# Patient Record
Sex: MALE
[De-identification: names, ages, dates, MRNs, and addresses within clinical notes are randomized per-mention and may not be internally consistent; named-entity substitution may affect disease eponyms.]

---

## 2020-05-16 ENCOUNTER — TRANSCRIPTION ENCOUNTER (OUTPATIENT)
Age: 50
End: 2020-05-16

## 2021-10-25 PROBLEM — Z00.00 ENCOUNTER FOR PREVENTIVE HEALTH EXAMINATION: Status: ACTIVE | Noted: 2021-10-25

## 2021-10-27 ENCOUNTER — APPOINTMENT (OUTPATIENT)
Dept: UROLOGY | Facility: CLINIC | Age: 51
End: 2021-10-27
Payer: COMMERCIAL

## 2021-10-27 VITALS
SYSTOLIC BLOOD PRESSURE: 149 MMHG | HEART RATE: 87 BPM | WEIGHT: 205 LBS | BODY MASS INDEX: 31.07 KG/M2 | DIASTOLIC BLOOD PRESSURE: 91 MMHG | HEIGHT: 68 IN | TEMPERATURE: 97.2 F

## 2021-10-27 DIAGNOSIS — Z83.3 FAMILY HISTORY OF DIABETES MELLITUS: ICD-10-CM

## 2021-10-27 DIAGNOSIS — Z80.0 FAMILY HISTORY OF MALIGNANT NEOPLASM OF DIGESTIVE ORGANS: ICD-10-CM

## 2021-10-27 DIAGNOSIS — I10 ESSENTIAL (PRIMARY) HYPERTENSION: ICD-10-CM

## 2021-10-27 DIAGNOSIS — E11.9 TYPE 2 DIABETES MELLITUS W/OUT COMPLICATIONS: ICD-10-CM

## 2021-10-27 DIAGNOSIS — M79.10 MYALGIA, UNSPECIFIED SITE: ICD-10-CM

## 2021-10-27 PROCEDURE — 51736 URINE FLOW MEASUREMENT: CPT

## 2021-10-27 PROCEDURE — 99204 OFFICE O/P NEW MOD 45 MIN: CPT | Mod: 25

## 2021-10-27 PROCEDURE — 51798 US URINE CAPACITY MEASURE: CPT

## 2021-10-28 PROBLEM — E11.9 DIABETES: Status: ACTIVE | Noted: 2021-10-27

## 2021-10-28 PROBLEM — Z83.3 FAMILY HISTORY OF DIABETES MELLITUS: Status: ACTIVE | Noted: 2021-10-27

## 2021-10-28 PROBLEM — Z80.0 FAMILY HISTORY OF LIVER CANCER: Status: ACTIVE | Noted: 2021-10-27

## 2021-10-28 PROBLEM — I10 BENIGN ESSENTIAL HYPERTENSION: Status: RESOLVED | Noted: 2021-10-28 | Resolved: 2021-10-28

## 2021-10-28 PROBLEM — M79.10 MUSCLE ACHE: Status: ACTIVE | Noted: 2021-10-27

## 2021-10-28 PROBLEM — Z80.0 FAMILY HISTORY OF PANCREATIC CANCER: Status: ACTIVE | Noted: 2021-10-27

## 2021-10-28 RX ORDER — TICAGRELOR 90 MG/1
90 TABLET ORAL
Qty: 180 | Refills: 0 | Status: ACTIVE | COMMUNITY
Start: 2021-10-24

## 2021-10-28 RX ORDER — ATORVASTATIN CALCIUM 80 MG/1
80 TABLET, FILM COATED ORAL
Qty: 90 | Refills: 0 | Status: ACTIVE | COMMUNITY
Start: 2021-09-09

## 2021-10-28 RX ORDER — METOPROLOL TARTRATE 75 MG/1
TABLET, FILM COATED ORAL
Refills: 0 | Status: ACTIVE | COMMUNITY

## 2021-10-28 RX ORDER — OMEPRAZOLE 20 MG/1
20 CAPSULE, DELAYED RELEASE ORAL
Qty: 90 | Refills: 0 | Status: ACTIVE | COMMUNITY
Start: 2021-10-04

## 2021-10-28 RX ORDER — METOPROLOL SUCCINATE 25 MG/1
25 TABLET, EXTENDED RELEASE ORAL
Qty: 90 | Refills: 0 | Status: ACTIVE | COMMUNITY
Start: 2021-08-09

## 2021-10-28 RX ORDER — NITROGLYCERIN 0.4 MG/1
0.4 TABLET SUBLINGUAL
Qty: 100 | Refills: 0 | Status: ACTIVE | COMMUNITY
Start: 2021-05-05

## 2021-10-28 RX ORDER — BENZONATATE 200 MG/1
200 CAPSULE ORAL
Qty: 30 | Refills: 0 | Status: ACTIVE | COMMUNITY
Start: 2021-08-14

## 2021-10-28 RX ORDER — ASPIRIN 325 MG/1
TABLET, FILM COATED ORAL
Refills: 0 | Status: ACTIVE | COMMUNITY

## 2021-10-28 RX ORDER — AZITHROMYCIN 250 MG/1
250 TABLET, FILM COATED ORAL
Qty: 6 | Refills: 0 | Status: ACTIVE | COMMUNITY
Start: 2021-08-14

## 2021-10-28 RX ORDER — EMPAGLIFLOZIN AND METFORMIN HYDROCHLORIDE 5; 1000 MG/1; MG/1
5-1000 TABLET ORAL
Qty: 90 | Refills: 0 | Status: ACTIVE | COMMUNITY
Start: 2021-09-02

## 2021-10-28 RX ORDER — LOSARTAN POTASSIUM 25 MG/1
25 TABLET, FILM COATED ORAL
Qty: 30 | Refills: 0 | Status: ACTIVE | COMMUNITY
Start: 2021-05-03

## 2021-10-28 RX ORDER — LANCING DEVICE/LANCETS
KIT MISCELLANEOUS
Qty: 1 | Refills: 0 | Status: ACTIVE | COMMUNITY
Start: 2021-10-04

## 2021-10-28 RX ORDER — IBUPROFEN 600 MG/1
600 TABLET, FILM COATED ORAL
Qty: 21 | Refills: 0 | Status: ACTIVE | COMMUNITY
Start: 2021-06-10

## 2021-10-29 NOTE — REVIEW OF SYSTEMS
[History of kidney stones] : history of kidney stones [Wake up at night to urinate  How many times?  ___] : wakes up to urinate [unfilled] times during the night [Strong urge to urinate] : strong urge to urinate [Bladder pressure] : experiences bladder pressure [Wait a long time to urinate] : waits a long time to urinate [Slow urine stream] : slow urine stream [Bladder fullness after urinating] : bladder fullness after urinating [Negative] : Heme/Lymph [Hesitancy] : urinary hesitancy [Nocturia] : nocturia [Urine Infection (bladder/kidney)] : denies bladder/kidney infections [Pain during urination] : denies pain during urination [Pain at onset of urination] : denies pain during onset of urination [Pain after urination] : denies pain after urination [Blood in urine that you can see] : denies seeing blood in urine [Told you have blood in urine on a urine test] : denies being told that blood was present in a urine test [Discharge from urine canal] : denies discharge from urine canal [Urine retention] : denies urine retention [Strain or push to urinate] : denies straining or pushing to urinate [Interrupted urine stream] : denies interrupted urine stream [Increased pain/discomfort with bladder filling] : denies increased pain/discomfort with bladder filling [Bladder problems as child. If yes, describe..] : denies bladder problems as child [Leakage of urine with straining, coughing, laughing] : denies leakage of urine with straining, coughing, and/or laughing [Unaware of when urine is leaking] : aware of when urine is leaking

## 2021-10-29 NOTE — PHYSICAL EXAM
[General Appearance - Well Developed] : well developed [General Appearance - Well Nourished] : well nourished [Normal Appearance] : normal appearance [Well Groomed] : well groomed [General Appearance - In No Acute Distress] : no acute distress [Edema] : no peripheral edema [Respiration, Rhythm And Depth] : normal respiratory rhythm and effort [Exaggerated Use Of Accessory Muscles For Inspiration] : no accessory muscle use [Abdomen Soft] : soft [Costovertebral Angle Tenderness] : no ~M costovertebral angle tenderness [Abdomen Tenderness] : non-tender [Urethral Meatus] : meatus normal [Penis Abnormality] : normal uncircumcised penis [Urinary Bladder Findings] : the bladder was normal on palpation [Scrotum] : the scrotum was normal [Testes Mass (___cm)] : there were no testicular masses [Prostate Tenderness] : the prostate was not tender [No Prostate Nodules] : no prostate nodules [Prostate Size ___ (0-4)] : prostate size [unfilled] (scale: 0-4) [Normal Station and Gait] : the gait and station were normal for the patient's age [] : no rash [No Focal Deficits] : no focal deficits [Oriented To Time, Place, And Person] : oriented to person, place, and time [Affect] : the affect was normal [Mood] : the mood was normal [Not Anxious] : not anxious [No Palpable Adenopathy] : no palpable adenopathy

## 2021-10-29 NOTE — HISTORY OF PRESENT ILLNESS
[FreeTextEntry1] : This is a 51-year-old  male with complaints of slow urination, frequency, urgency, nocturia x3 and sensation of incomplete bladder emptying denies dysuria, hematuria or history of urinary tract infections.  He does not have family history of prostate cancer.  The patient states that he passed a kidney stone approximately 6 years ago.  He is reporting difficulties with his erections and significant premature ejaculation.  He is also describing some decrease in his libido.  PSA on 10/11/2021 was 1.30 ng/mL.

## 2021-10-29 NOTE — ASSESSMENT
[FreeTextEntry1] : Thus, the patient has bothersome voiding symptoms with a good stream but a significant postvoid residual.  In addition he has erectile dysfunction and bothersome premature ejaculation.  The patient be placed on alfuzosin, 10 mg daily and paroxetine, 10 mg nightly for 2 weeks and then as needed.  I urged him to maintain good glycemic control as this can certainly be a factor in both voiding symptoms and sexual function.  We will reevaluate again in 3 months.

## 2022-01-26 ENCOUNTER — APPOINTMENT (OUTPATIENT)
Dept: UROLOGY | Facility: CLINIC | Age: 52
End: 2022-01-26
Payer: COMMERCIAL

## 2022-01-26 PROCEDURE — 99214 OFFICE O/P EST MOD 30 MIN: CPT

## 2022-01-26 PROCEDURE — 51798 US URINE CAPACITY MEASURE: CPT

## 2022-01-26 PROCEDURE — 51741 ELECTRO-UROFLOWMETRY FIRST: CPT

## 2022-01-28 ENCOUNTER — NON-APPOINTMENT (OUTPATIENT)
Age: 52
End: 2022-01-28

## 2022-01-29 NOTE — ASSESSMENT
[FreeTextEntry1] : The patient has persistent but significantly improved lower urinary tract symptoms with excellent stream and bladder emptying and we will continue him on alfuzosin.  He had poor tolerance of paroxetine and we discussed using analgesic spray, such as Promescent, to help with premature ejaculation.  We discussed the potential addition of a PDE 5 inhibitor, which the patient used previously; however, the patient does have a prescription for nitroglycerin and I informed him that there is a absolute contraindication to the use of PDE 5 inhibitors.The patient will come back in 3 months

## 2022-01-29 NOTE — HISTORY OF PRESENT ILLNESS
[FreeTextEntry1] : The patient is reporting improved urinary stream and decreased urgency and nocturia since taking alfuzosin.  He still has some urgency and minimal urge incontinence.  The patient could not tolerate paroxetine as it was making him "blurry" and stopped the medication.  He is describing persistent premature ejaculation and also now complaining of some decreased rigidity with his erections.  He denies dysuria, hematuria or flank pain.

## 2022-01-29 NOTE — LETTER BODY
[Dear  ___] : Dear  [unfilled], [Courtesy Letter:] : I had the pleasure of seeing your patient, [unfilled], in my office today. [Please see my note below.] : Please see my note below. [Consult Closing:] : Thank you very much for allowing me to participate in the care of this patient.  If you have any questions, please do not hesitate to contact me. [Sincerely,] : Sincerely, [FreeTextEntry3] : Tucker Enriquez MD

## 2022-01-29 NOTE — REVIEW OF SYSTEMS
[Nocturia] : nocturia [History of kidney stones] : history of kidney stones [Wake up at night to urinate  How many times?  ___] : wakes up to urinate [unfilled] times during the night [Strong urge to urinate] : strong urge to urinate [Bladder pressure] : experiences bladder pressure [Wait a long time to urinate] : waits a long time to urinate [Slow urine stream] : slow urine stream [Bladder fullness after urinating] : bladder fullness after urinating [Negative] : Heme/Lymph [Urine Infection (bladder/kidney)] : denies bladder/kidney infections [Pain during urination] : denies pain during urination [Pain at onset of urination] : denies pain during onset of urination [Pain after urination] : denies pain after urination [Blood in urine that you can see] : denies seeing blood in urine [Told you have blood in urine on a urine test] : denies being told that blood was present in a urine test [Discharge from urine canal] : denies discharge from urine canal [Urine retention] : denies urine retention [Strain or push to urinate] : denies straining or pushing to urinate [Interrupted urine stream] : denies interrupted urine stream [Increased pain/discomfort with bladder filling] : denies increased pain/discomfort with bladder filling [Bladder problems as child. If yes, describe..] : denies bladder problems as child [Leakage of urine with straining, coughing, laughing] : denies leakage of urine with straining, coughing, and/or laughing [Unaware of when urine is leaking] : aware of when urine is leaking

## 2022-05-09 ENCOUNTER — APPOINTMENT (OUTPATIENT)
Dept: UROLOGY | Facility: CLINIC | Age: 52
End: 2022-05-09
Payer: COMMERCIAL

## 2022-05-09 VITALS
WEIGHT: 205 LBS | HEIGHT: 68 IN | TEMPERATURE: 98 F | DIASTOLIC BLOOD PRESSURE: 89 MMHG | BODY MASS INDEX: 31.07 KG/M2 | SYSTOLIC BLOOD PRESSURE: 142 MMHG | HEART RATE: 84 BPM

## 2022-05-09 DIAGNOSIS — Z87.442 PERSONAL HISTORY OF URINARY CALCULI: ICD-10-CM

## 2022-05-09 PROCEDURE — 51798 US URINE CAPACITY MEASURE: CPT

## 2022-05-09 PROCEDURE — 51741 ELECTRO-UROFLOWMETRY FIRST: CPT

## 2022-05-09 PROCEDURE — 99214 OFFICE O/P EST MOD 30 MIN: CPT | Mod: 25

## 2022-05-11 PROBLEM — Z87.442 HISTORY OF KIDNEY STONES: Status: ACTIVE | Noted: 2022-01-26

## 2022-05-11 NOTE — REVIEW OF SYSTEMS
[Strong urge to urinate] : strong urge to urinate [Negative] : Heme/Lymph [Nocturia] : nocturia [Urine Infection (bladder/kidney)] : denies bladder/kidney infections [Pain during urination] : denies pain during urination [Pain at onset of urination] : denies pain during onset of urination [Pain after urination] : denies pain after urination [Blood in urine that you can see] : denies seeing blood in urine [Told you have blood in urine on a urine test] : denies being told that blood was present in a urine test [Discharge from urine canal] : denies discharge from urine canal [History of kidney stones] : history of kidney stones [Urine retention] : denies urine retention [Wake up at night to urinate  How many times?  ___] : wakes up to urinate [unfilled] times during the night [Bladder pressure] : experiences bladder pressure [Strain or push to urinate] : denies straining or pushing to urinate [Wait a long time to urinate] : waits a long time to urinate [Slow urine stream] : slow urine stream [Interrupted urine stream] : denies interrupted urine stream [Bladder fullness after urinating] : bladder fullness after urinating [Increased pain/discomfort with bladder filling] : denies increased pain/discomfort with bladder filling [Bladder problems as child. If yes, describe..] : denies bladder problems as child [Leakage of urine with straining, coughing, laughing] : denies leakage of urine with straining, coughing, and/or laughing [Unaware of when urine is leaking] : aware of when urine is leaking

## 2022-05-11 NOTE — HISTORY OF PRESENT ILLNESS
[FreeTextEntry1] : The patient is reporting improvement in hs voiding pattern with decreased urgency and frequency.  He is voiding with an adequate force of stream, mild frequency and nocturia x2 but no dysuria or hematuria.  The patient is undergoing evaluation for persistent pains in his feet and muscles.

## 2022-05-11 NOTE — PHYSICAL EXAM
[General Appearance - Well Developed] : well developed [Normal Appearance] : normal appearance [General Appearance - Well Nourished] : well nourished [Well Groomed] : well groomed [General Appearance - In No Acute Distress] : no acute distress [Abdomen Soft] : soft [Abdomen Tenderness] : non-tender [Costovertebral Angle Tenderness] : no ~M costovertebral angle tenderness [Urethral Meatus] : meatus normal [Urinary Bladder Findings] : the bladder was normal on palpation [Penis Abnormality] : normal uncircumcised penis [Scrotum] : the scrotum was normal [Testes Mass (___cm)] : there were no testicular masses [Prostate Tenderness] : the prostate was not tender [No Prostate Nodules] : no prostate nodules [Prostate Size ___ (0-4)] : prostate size [unfilled] (scale: 0-4) [Edema] : no peripheral edema [] : no respiratory distress [Respiration, Rhythm And Depth] : normal respiratory rhythm and effort [Exaggerated Use Of Accessory Muscles For Inspiration] : no accessory muscle use [Oriented To Time, Place, And Person] : oriented to person, place, and time [Affect] : the affect was normal [Mood] : the mood was normal [Not Anxious] : not anxious [Normal Station and Gait] : the gait and station were normal for the patient's age [No Focal Deficits] : no focal deficits [No Palpable Adenopathy] : no palpable adenopathy

## 2022-10-12 ENCOUNTER — APPOINTMENT (OUTPATIENT)
Dept: UROLOGY | Facility: CLINIC | Age: 52
End: 2022-10-12

## 2022-10-12 DIAGNOSIS — R68.82 DECREASED LIBIDO: ICD-10-CM

## 2022-10-12 DIAGNOSIS — N52.9 MALE ERECTILE DYSFUNCTION, UNSPECIFIED: ICD-10-CM

## 2022-10-12 DIAGNOSIS — N40.1 BENIGN PROSTATIC HYPERPLASIA WITH LOWER URINARY TRACT SYMPMS: ICD-10-CM

## 2022-10-12 DIAGNOSIS — F52.4 PREMATURE EJACULATION: ICD-10-CM

## 2022-10-12 PROCEDURE — 51798 US URINE CAPACITY MEASURE: CPT

## 2022-10-12 PROCEDURE — 99214 OFFICE O/P EST MOD 30 MIN: CPT | Mod: 25

## 2022-10-12 PROCEDURE — 51741 ELECTRO-UROFLOWMETRY FIRST: CPT

## 2022-10-12 RX ORDER — OMEPRAZOLE 40 MG/1
40 CAPSULE, DELAYED RELEASE ORAL
Refills: 0 | Status: COMPLETED | COMMUNITY
End: 2022-10-12

## 2022-10-12 RX ORDER — PAROXETINE HYDROCHLORIDE 10 MG/1
10 TABLET, FILM COATED ORAL DAILY
Qty: 30 | Refills: 5 | Status: ACTIVE | COMMUNITY
Start: 2021-10-27 | End: 1900-01-01

## 2022-10-12 RX ORDER — ALFUZOSIN HYDROCHLORIDE 10 MG/1
10 TABLET, EXTENDED RELEASE ORAL DAILY
Qty: 90 | Refills: 3 | Status: ACTIVE | COMMUNITY
Start: 2021-10-27 | End: 1900-01-01

## 2022-10-12 NOTE — HISTORY OF PRESENT ILLNESS
[FreeTextEntry1] : The patient is reporting improvement in his voiding pattern.  He is voiding with an adequate force of stream and nocturia x 1 and no dysuria or hematuria. Patient did not fill the prescription for paroxetine yet as he has not been sexually active.  He reports no issues with his erections but states he has not had much of a sex life due to his wife being sick. Patient's PSA was 1.1ng/ml on 9/13/22.

## 2022-10-12 NOTE — ASSESSMENT
[FreeTextEntry1] : The patient has stable lower urinary tract symptoms and we will continue him on alfuzosin.  He will discuss possible removal of nitroglycerin from his regimen with his cardiologist so that he can use PDE 5 inhibitors to help with his erections. We will send a send a refill for the patient's alfuzosin and paroxetinel. If all stable, we will evaluate him again in 6 months.

## 2022-10-12 NOTE — ADDENDUM
[FreeTextEntry1] : Next visit: uroflow, PVR\par \par Mari MELENDREZ documented this note as a scribe on behalf of Dr. Tucker Enriquez MD.\par

## 2023-04-12 ENCOUNTER — APPOINTMENT (OUTPATIENT)
Dept: UROLOGY | Facility: CLINIC | Age: 53
End: 2023-04-12

## 2023-06-07 ENCOUNTER — APPOINTMENT (OUTPATIENT)
Dept: UROLOGY | Facility: CLINIC | Age: 53
End: 2023-06-07

## 2023-12-08 NOTE — ASSESSMENT
Dressings to surgical wounds CDI.  
Okay per dr nelson to start IV in any extremity  
Received patient from OR, sleepy/arousable.  Abdomen, chest w/ numerous gauze /tegaderm dressings.  All CDI.  Dressing to left upper arm CDI.  Continue to monitor.  
Reviewed patient's history, labs, and EKG with anesthesia. Per anesthesia, OK to proceed with 12/08/23 procedure in [facility].    
Spoke to patient and provided the following information:     Nothing to eat or drink (including gum or candy) after midnight evening prior to procedure    Wear comfortable loose fitting clothing    Leave any valuables at home or with support person, including wallet and jewelry     Remove all jewelry and piercings, wedding bands included    Bring photo ID, insurance card and copay if needed.    Bathe or shower noc prior or am of surgery, no lotion, dry hair, no contacts and no deodorant.  Arrival time will be given 12/06/23 (2 business days prior to surgery) in the AFTERNOON     Disregard any text, automatic calls, or [employer] maritza times if given    [address] in [place] - come in through the first door and the surgery center door is immediately on your left - then proceed downstairs to check in.      Have an adult drive you to and from procedure and have someone at home with you for the first 24 hours post op.       
[FreeTextEntry1] : The patient has stable lower urinary tract symptoms and we will continue him on alfuzosin.  He will discuss removing nitroglycerin from his regimen with his cardiologist so that he can use PDE 5 inhibitors to help with his erections.  We will evaluate him again in 4 months.

## 2024-06-10 ENCOUNTER — APPOINTMENT (OUTPATIENT)
Dept: UROLOGY | Facility: CLINIC | Age: 54
End: 2024-06-10